# Patient Record
Sex: FEMALE | Race: WHITE | Employment: FULL TIME | URBAN - METROPOLITAN AREA
[De-identification: names, ages, dates, MRNs, and addresses within clinical notes are randomized per-mention and may not be internally consistent; named-entity substitution may affect disease eponyms.]

---

## 2023-05-02 ENCOUNTER — TELEPHONE (OUTPATIENT)
Dept: PRIMARY CARE | Facility: CLINIC | Age: 33
End: 2023-05-02

## 2023-05-02 DIAGNOSIS — I10 BENIGN ESSENTIAL HYPERTENSION: Primary | ICD-10-CM

## 2023-05-02 RX ORDER — SPIRONOLACTONE 25 MG/1
25 TABLET ORAL DAILY
Qty: 90 TABLET | Refills: 3 | Status: SHIPPED | OUTPATIENT
Start: 2023-05-02

## 2023-05-02 RX ORDER — CIPROFLOXACIN HYDROCHLORIDE 3 MG/ML
SOLUTION/ DROPS OPHTHALMIC
COMMUNITY
Start: 2022-12-31 | End: 2023-10-02 | Stop reason: ALTCHOICE

## 2023-05-02 RX ORDER — SPIRONOLACTONE 25 MG/1
25 TABLET ORAL DAILY
COMMUNITY
End: 2023-05-02 | Stop reason: SDUPTHER

## 2023-05-02 RX ORDER — ALPRAZOLAM 0.5 MG/1
1 TABLET ORAL 3 TIMES DAILY PRN
COMMUNITY
Start: 2022-08-12 | End: 2023-12-26 | Stop reason: SDUPTHER

## 2023-05-02 RX ORDER — BISOPROLOL FUMARATE 5 MG/1
TABLET, FILM COATED ORAL
Qty: 90 TABLET | Refills: 3 | Status: SHIPPED | OUTPATIENT
Start: 2023-05-02 | End: 2023-10-02 | Stop reason: ALTCHOICE

## 2023-05-02 RX ORDER — DULOXETIN HYDROCHLORIDE 30 MG/1
1 CAPSULE, DELAYED RELEASE ORAL DAILY
COMMUNITY
Start: 2022-08-12 | End: 2023-07-27

## 2023-05-02 RX ORDER — BISOPROLOL FUMARATE 5 MG/1
TABLET, FILM COATED ORAL
COMMUNITY
End: 2023-05-02 | Stop reason: SDUPTHER

## 2023-05-02 RX ORDER — ERGOCALCIFEROL 1.25 MG/1
1 CAPSULE ORAL WEEKLY
Qty: 4 CAPSULE | Refills: 16 | COMMUNITY
Start: 2022-03-01 | End: 2023-07-12

## 2023-05-02 RX ORDER — TOPIRAMATE 25 MG/1
1 TABLET ORAL 2 TIMES DAILY
COMMUNITY
Start: 2022-04-20 | End: 2023-12-26 | Stop reason: ALTCHOICE

## 2023-05-02 RX ORDER — SEMAGLUTIDE 0.5 MG/.5ML
0.5 INJECTION, SOLUTION SUBCUTANEOUS
COMMUNITY
Start: 2023-05-17 | End: 2023-06-16

## 2023-05-02 RX ORDER — ONDANSETRON 8 MG/1
TABLET, ORALLY DISINTEGRATING ORAL
COMMUNITY
Start: 2022-11-10 | End: 2023-10-02 | Stop reason: ALTCHOICE

## 2023-05-02 RX ORDER — NITROFURANTOIN 25; 75 MG/1; MG/1
100 CAPSULE ORAL 2 TIMES DAILY
COMMUNITY
Start: 2022-06-22 | End: 2023-10-02 | Stop reason: ALTCHOICE

## 2023-05-02 RX ORDER — CALCIUM CARBONATE 200(500)MG
1 TABLET,CHEWABLE ORAL AS NEEDED
COMMUNITY
Start: 2022-02-08

## 2023-05-02 RX ORDER — LANOLIN ALCOHOL/MO/W.PET/CERES
1000 CREAM (GRAM) TOPICAL
COMMUNITY
Start: 2023-04-19

## 2023-05-02 RX ORDER — SEMAGLUTIDE 0.25 MG/.5ML
0.25 INJECTION, SOLUTION SUBCUTANEOUS
COMMUNITY
End: 2023-10-02 | Stop reason: ALTCHOICE

## 2023-05-02 RX ORDER — HYDROXYZINE HYDROCHLORIDE 25 MG/1
TABLET, FILM COATED ORAL
COMMUNITY
Start: 2022-05-13 | End: 2023-10-02 | Stop reason: ALTCHOICE

## 2023-05-02 RX ORDER — VIT C/E/ZN/COPPR/LUTEIN/ZEAXAN 250MG-90MG
1000 CAPSULE ORAL
COMMUNITY
Start: 2023-04-19

## 2023-05-02 RX ORDER — DULOXETIN HYDROCHLORIDE 20 MG/1
20 CAPSULE, DELAYED RELEASE ORAL DAILY
COMMUNITY
Start: 2022-08-12 | End: 2023-10-02 | Stop reason: DRUGHIGH

## 2023-05-16 ENCOUNTER — APPOINTMENT (OUTPATIENT)
Dept: LAB | Facility: LAB | Age: 33
End: 2023-05-16

## 2023-05-16 LAB
ALANINE AMINOTRANSFERASE (SGPT) (U/L) IN SER/PLAS: 23 U/L (ref 7–45)
ALBUMIN (G/DL) IN SER/PLAS: 4.4 G/DL (ref 3.4–5)
ALKALINE PHOSPHATASE (U/L) IN SER/PLAS: 54 U/L (ref 33–110)
ASPARTATE AMINOTRANSFERASE (SGOT) (U/L) IN SER/PLAS: 20 U/L (ref 9–39)
BASOPHILS (10*3/UL) IN BLOOD BY AUTOMATED COUNT: 0.05 X10E9/L (ref 0–0.1)
BASOPHILS/100 LEUKOCYTES IN BLOOD BY AUTOMATED COUNT: 0.5 % (ref 0–2)
BILIRUBIN DIRECT (MG/DL) IN SER/PLAS: 0.1 MG/DL (ref 0–0.3)
BILIRUBIN TOTAL (MG/DL) IN SER/PLAS: 0.4 MG/DL (ref 0–1.2)
C REACTIVE PROTEIN (MG/L) IN SER/PLAS: 0.24 MG/DL
DEAMIDATED GLIADIN PEPTIDE IGA: 9 U/ML (ref 0–14)
DEAMIDATED GLIADIN PEPTIDE IGG: 90 U/ML (ref 0–14)
EOSINOPHILS (10*3/UL) IN BLOOD BY AUTOMATED COUNT: 0.11 X10E9/L (ref 0–0.7)
EOSINOPHILS/100 LEUKOCYTES IN BLOOD BY AUTOMATED COUNT: 1 % (ref 0–6)
ERYTHROCYTE DISTRIBUTION WIDTH (RATIO) BY AUTOMATED COUNT: 13.3 % (ref 11.5–14.5)
ERYTHROCYTE MEAN CORPUSCULAR HEMOGLOBIN CONCENTRATION (G/DL) BY AUTOMATED: 33 G/DL (ref 32–36)
ERYTHROCYTE MEAN CORPUSCULAR VOLUME (FL) BY AUTOMATED COUNT: 84 FL (ref 80–100)
ERYTHROCYTES (10*6/UL) IN BLOOD BY AUTOMATED COUNT: 5.5 X10E12/L (ref 4–5.2)
HEMATOCRIT (%) IN BLOOD BY AUTOMATED COUNT: 46 % (ref 36–46)
HEMOGLOBIN (G/DL) IN BLOOD: 15.2 G/DL (ref 12–16)
HEPATITIS A TOTAL AB INTERPRETATION: NONREACTIVE
HEPATITIS B VIRUS CORE AB (PRESENCE) IN SER/PLAS BY IMM: NONREACTIVE
HEPATITIS B VIRUS SURFACE AB (MIU/ML) IN SERUM: <3.1 MIU/ML
HEPATITIS B VIRUS SURFACE AG PRESENCE IN SERUM: NONREACTIVE
IMMATURE GRANULOCYTES/100 LEUKOCYTES IN BLOOD BY AUTOMATED COUNT: 0.3 % (ref 0–0.9)
LEUKOCYTES (10*3/UL) IN BLOOD BY AUTOMATED COUNT: 10.6 X10E9/L (ref 4.4–11.3)
LYMPHOCYTES (10*3/UL) IN BLOOD BY AUTOMATED COUNT: 2.63 X10E9/L (ref 1.2–4.8)
LYMPHOCYTES/100 LEUKOCYTES IN BLOOD BY AUTOMATED COUNT: 24.7 % (ref 13–44)
MONOCYTES (10*3/UL) IN BLOOD BY AUTOMATED COUNT: 0.83 X10E9/L (ref 0.1–1)
MONOCYTES/100 LEUKOCYTES IN BLOOD BY AUTOMATED COUNT: 7.8 % (ref 2–10)
NEUTROPHILS (10*3/UL) IN BLOOD BY AUTOMATED COUNT: 6.99 X10E9/L (ref 1.2–7.7)
NEUTROPHILS/100 LEUKOCYTES IN BLOOD BY AUTOMATED COUNT: 65.7 % (ref 40–80)
NRBC (PER 100 WBCS) BY AUTOMATED COUNT: 0 /100 WBC (ref 0–0)
PLATELETS (10*3/UL) IN BLOOD AUTOMATED COUNT: 337 X10E9/L (ref 150–450)
PROTEIN TOTAL: 6.8 G/DL (ref 6.4–8.2)
TISSUE TRANSGLUTAMINASE IGG: 21 U/ML (ref 0–14)
TISSUE TRANSGLUTAMINASE, IGA: 201 U/ML (ref 0–14)

## 2023-05-19 LAB
NIL(NEG) CONTROL SPOT COUNT: NORMAL
PANEL A SPOT COUNT: 0
PANEL B SPOT COUNT: 0
POS CONTROL SPOT COUNT: NORMAL
T-SPOT. TB INTERPRETATION: NEGATIVE

## 2023-07-27 DIAGNOSIS — F41.9 ANXIETY DISORDER, UNSPECIFIED: ICD-10-CM

## 2023-07-27 RX ORDER — CYCLOBENZAPRINE HCL 10 MG
1 TABLET ORAL 3 TIMES DAILY PRN
COMMUNITY
Start: 2022-03-12 | End: 2023-10-02 | Stop reason: ALTCHOICE

## 2023-07-27 RX ORDER — DULOXETIN HYDROCHLORIDE 30 MG/1
30 CAPSULE, DELAYED RELEASE ORAL DAILY
Qty: 90 CAPSULE | Refills: 3 | Status: SHIPPED | OUTPATIENT
Start: 2023-07-27 | End: 2023-12-26 | Stop reason: DRUGHIGH

## 2023-07-27 RX ORDER — FERROUS FUMARATE 324(106)MG
TABLET ORAL
COMMUNITY
Start: 2023-07-19

## 2023-07-27 RX ORDER — NORETHINDRONE 0.35 MG/1
1 TABLET ORAL DAILY
COMMUNITY
Start: 2018-05-02 | End: 2023-10-02 | Stop reason: ALTCHOICE

## 2023-07-27 RX ORDER — SERTRALINE HYDROCHLORIDE 50 MG/1
TABLET, FILM COATED ORAL
COMMUNITY
Start: 2018-05-02 | End: 2023-10-02 | Stop reason: ALTCHOICE

## 2023-07-27 RX ORDER — PEN NEEDLE, DIABETIC 31 GX5/16"
NEEDLE, DISPOSABLE MISCELLANEOUS
COMMUNITY
Start: 2023-06-28 | End: 2024-02-10 | Stop reason: ALTCHOICE

## 2023-07-27 RX ORDER — BISOPROLOL FUMARATE AND HYDROCHLOROTHIAZIDE 2.5; 6.25 MG/1; MG/1
TABLET ORAL
COMMUNITY
Start: 2018-04-10 | End: 2023-10-14 | Stop reason: ALTCHOICE

## 2023-08-29 PROBLEM — N94.6 DYSMENORRHEA: Status: ACTIVE | Noted: 2023-08-29

## 2023-08-29 PROBLEM — R53.83 FATIGUE: Status: ACTIVE | Noted: 2023-08-29

## 2023-08-29 PROBLEM — R87.610 ATYPICAL SQUAMOUS CELL OF UNDETERMINED SIGNIFICANCE OF CERVIX: Status: ACTIVE | Noted: 2023-08-29

## 2023-08-29 PROBLEM — Z79.899 HIGH RISK MEDICATION USE: Status: ACTIVE | Noted: 2023-08-29

## 2023-08-29 PROBLEM — R43.8 HYPOSMIA: Status: ACTIVE | Noted: 2023-08-29

## 2023-08-29 PROBLEM — E83.52 HYPERCALCEMIA: Status: ACTIVE | Noted: 2023-08-29

## 2023-08-29 PROBLEM — K50.90 CROHN'S DISEASE (MULTI): Status: ACTIVE | Noted: 2021-12-03

## 2023-08-29 PROBLEM — E55.9 VITAMIN D DEFICIENCY: Status: ACTIVE | Noted: 2023-08-29

## 2023-08-29 PROBLEM — R87.612 LGSIL ON PAP SMEAR OF CERVIX: Status: ACTIVE | Noted: 2023-08-29

## 2023-08-29 PROBLEM — Z78.9 USES CONTRACEPTION: Status: ACTIVE | Noted: 2023-08-29

## 2023-08-29 PROBLEM — M13.0 POLYARTHRITIS: Status: ACTIVE | Noted: 2023-08-29

## 2023-08-29 PROBLEM — R73.9 BORDERLINE HYPERGLYCEMIA: Status: ACTIVE | Noted: 2023-08-29

## 2023-08-29 PROBLEM — R63.5 ABNORMAL WEIGHT GAIN: Status: ACTIVE | Noted: 2023-08-29

## 2023-08-29 PROBLEM — R73.09 ELEVATED GLUCOSE: Status: ACTIVE | Noted: 2023-08-29

## 2023-08-29 PROBLEM — F41.9 ANXIETY: Status: ACTIVE | Noted: 2021-12-03

## 2023-08-29 PROBLEM — D75.1 POLYCYTHEMIA: Status: ACTIVE | Noted: 2023-08-29

## 2023-08-29 PROBLEM — M26.609 TMJ (TEMPOROMANDIBULAR JOINT SYNDROME): Status: ACTIVE | Noted: 2023-08-29

## 2023-08-29 PROBLEM — I10 PRIMARY HYPERTENSION: Status: ACTIVE | Noted: 2021-12-03

## 2023-08-31 PROBLEM — M54.9 BACK PAIN: Status: ACTIVE | Noted: 2023-08-31

## 2023-08-31 PROBLEM — B97.7 HPV (HUMAN PAPILLOMA VIRUS) INFECTION: Status: ACTIVE | Noted: 2023-08-31

## 2023-09-11 ENCOUNTER — HOSPITAL ENCOUNTER (OUTPATIENT)
Dept: DATA CONVERSION | Facility: HOSPITAL | Age: 33
End: 2023-09-11
Attending: INTERNAL MEDICINE | Admitting: INTERNAL MEDICINE

## 2023-09-11 DIAGNOSIS — K63.3 ULCER OF INTESTINE: ICD-10-CM

## 2023-09-11 DIAGNOSIS — K37 UNSPECIFIED APPENDICITIS: ICD-10-CM

## 2023-09-11 DIAGNOSIS — K29.70 GASTRITIS, UNSPECIFIED, WITHOUT BLEEDING: ICD-10-CM

## 2023-09-11 DIAGNOSIS — K50.90 CROHN'S DISEASE, UNSPECIFIED, WITHOUT COMPLICATIONS (MULTI): ICD-10-CM

## 2023-09-12 RX ORDER — SEMAGLUTIDE 1 MG/.5ML
INJECTION, SOLUTION SUBCUTANEOUS
COMMUNITY
Start: 2023-09-06 | End: 2023-12-26 | Stop reason: ALTCHOICE

## 2023-09-12 RX ORDER — SEMAGLUTIDE 1.7 MG/.75ML
INJECTION, SOLUTION SUBCUTANEOUS
COMMUNITY
Start: 2023-09-06

## 2023-09-15 LAB
COMPLETE PATHOLOGY REPORT: NORMAL
CONVERTED CLINICAL DIAGNOSIS-HISTORY: NORMAL
CONVERTED FINAL DIAGNOSIS: NORMAL
CONVERTED FINAL REPORT PDF LINK TO COPY AND PASTE: NORMAL
CONVERTED GROSS DESCRIPTION: NORMAL

## 2023-10-02 ENCOUNTER — TELEMEDICINE (OUTPATIENT)
Dept: PRIMARY CARE | Facility: CLINIC | Age: 33
End: 2023-10-02
Payer: COMMERCIAL

## 2023-10-02 DIAGNOSIS — U07.1 ACUTE COVID-19: Primary | ICD-10-CM

## 2023-10-02 PROBLEM — R43.8 HYPOSMIA: Status: RESOLVED | Noted: 2023-08-29 | Resolved: 2023-10-02

## 2023-10-02 PROCEDURE — 99214 OFFICE O/P EST MOD 30 MIN: CPT | Performed by: FAMILY MEDICINE

## 2023-10-02 RX ORDER — ONDANSETRON 4 MG/1
8 TABLET, FILM COATED ORAL EVERY 8 HOURS PRN
COMMUNITY
Start: 2023-09-13 | End: 2023-10-02 | Stop reason: ALTCHOICE

## 2023-10-02 ASSESSMENT — ENCOUNTER SYMPTOMS
HEADACHES: 0
SINUS COMPLAINT: 1
CHILLS: 0
COUGH: 1
SHORTNESS OF BREATH: 0
SORE THROAT: 0
HOARSE VOICE: 0
SWOLLEN GLANDS: 0
SINUS PRESSURE: 1

## 2023-10-02 ASSESSMENT — LIFESTYLE VARIABLES: HISTORY_OF_SMOKING: I HAVE NEVER SMOKED

## 2023-10-02 NOTE — LETTER
October 2, 2023     Patient: Clarice Handy   YOB: 1990   Date of Visit: 10/2/2023       To Whom It May Concern:    Clarice Handy was seen in my clinic on 10/2/2023 at 9:35 pm. Please excuse Clarice for her absence from work. She is unable to return to work on 10/8/2023 if repeat COVID test is negative and symptoms have improved.     If you have any questions or concerns, please don't hesitate to call.         Sincerely,         Anastasia Hernandez MD        CC: No Recipients

## 2023-10-03 NOTE — PATIENT INSTRUCTIONS
Paxlovid 3 tabs s 2 times a day for 5 days.     Symptom management. Flonase, antihistamine for drippy nose, Iburpofen or tylenol. Drink lot of fluid.     Check COVID test on Day 10. If symptoms better and test negative, can go back to work on 10/8. IF still positive at 10 days, contact primary.

## 2023-10-14 DIAGNOSIS — I10 BENIGN ESSENTIAL HYPERTENSION: Primary | ICD-10-CM

## 2023-10-14 RX ORDER — BISOPROLOL FUMARATE AND HYDROCHLOROTHIAZIDE 5; 6.25 MG/1; MG/1
1 TABLET ORAL DAILY
Qty: 90 TABLET | Refills: 1 | Status: SHIPPED | OUTPATIENT
Start: 2023-10-14 | End: 2024-03-02 | Stop reason: ALTCHOICE

## 2023-12-09 ENCOUNTER — TELEMEDICINE (OUTPATIENT)
Dept: PRIMARY CARE | Facility: CLINIC | Age: 33
End: 2023-12-09
Payer: COMMERCIAL

## 2023-12-09 DIAGNOSIS — H10.31 ACUTE BACTERIAL CONJUNCTIVITIS OF RIGHT EYE: Primary | ICD-10-CM

## 2023-12-09 PROCEDURE — 99213 OFFICE O/P EST LOW 20 MIN: CPT | Performed by: FAMILY MEDICINE

## 2023-12-09 RX ORDER — POLYMYXIN B SULFATE AND TRIMETHOPRIM 1; 10000 MG/ML; [USP'U]/ML
1 SOLUTION OPHTHALMIC EVERY 6 HOURS
Qty: 10 ML | Refills: 0 | Status: SHIPPED | OUTPATIENT
Start: 2023-12-09 | End: 2023-12-16

## 2023-12-09 NOTE — PATIENT INSTRUCTIONS
Please take your medications as prescribed  please call your pcp if your symptoms fail to improve or worsen

## 2023-12-09 NOTE — PROGRESS NOTES
Subjective   Patient ID: Clarice Handy is a 33 y.o. female who presents for right eye drainage    HPI  Virtual Visit    An interactive audio and video telecommunication system which permits real time communications between the patient (at the originating site) and provider (at the distant site) was utilized to provide this telehealth service.   Verbal consent was requested and obtained from Clarice Handy on this date, 12/09/23 for a telehealth visit.     Patient woke up with drainage and itching from her right eye this morning, has some crusting. No blurry vision or eye pain. Daughter with pink eye.   No cough, sob or wheezing, no fever    Review of Systems  General: no fever  Eyes: see HPI  ENT: no sore throat, no ear pain  Resp: no cough, sob or wheezing  Abd: no nausea/vomiting    Vitals:   due to telehealth visit, vitals not obtained, patient did not have them available at the time of the visit     Objective   Physical Exam  Physical exam done virtually through the computer screen     Gen: NAD  eyes: right eye mild crusting on eyelash  Nose: external nose normal   Resp: does not appear to be short of breath at present time, no audible wheezing    Assessment/Plan   Problem List Items Addressed This Visit    None  Visit Diagnoses       Acute bacterial conjunctivitis of right eye    -  Primary    Relevant Medications    polymyxin B sulf-trimethoprim (Polytrim) ophthalmic solution

## 2023-12-11 ENCOUNTER — APPOINTMENT (OUTPATIENT)
Dept: OBSTETRICS AND GYNECOLOGY | Facility: CLINIC | Age: 33
End: 2023-12-11

## 2023-12-26 ENCOUNTER — OFFICE VISIT (OUTPATIENT)
Dept: PRIMARY CARE | Facility: CLINIC | Age: 33
End: 2023-12-26
Payer: COMMERCIAL

## 2023-12-26 VITALS
RESPIRATION RATE: 17 BRPM | WEIGHT: 168.6 LBS | OXYGEN SATURATION: 98 % | DIASTOLIC BLOOD PRESSURE: 78 MMHG | BODY MASS INDEX: 28.09 KG/M2 | HEART RATE: 89 BPM | SYSTOLIC BLOOD PRESSURE: 126 MMHG | HEIGHT: 65 IN

## 2023-12-26 DIAGNOSIS — I10 BENIGN ESSENTIAL HYPERTENSION: ICD-10-CM

## 2023-12-26 DIAGNOSIS — F41.9 ANXIETY DISORDER, UNSPECIFIED: ICD-10-CM

## 2023-12-26 DIAGNOSIS — Z11.59 ENCOUNTER FOR HEPATITIS C SCREENING TEST FOR LOW RISK PATIENT: ICD-10-CM

## 2023-12-26 DIAGNOSIS — Z00.00 ANNUAL PHYSICAL EXAM: Primary | ICD-10-CM

## 2023-12-26 DIAGNOSIS — E78.5 HYPERLIPIDEMIA, UNSPECIFIED HYPERLIPIDEMIA TYPE: ICD-10-CM

## 2023-12-26 DIAGNOSIS — K50.90 CROHN'S DISEASE WITHOUT COMPLICATION, UNSPECIFIED GASTROINTESTINAL TRACT LOCATION (MULTI): ICD-10-CM

## 2023-12-26 DIAGNOSIS — Z11.4 ENCOUNTER FOR SCREENING FOR HIV: ICD-10-CM

## 2023-12-26 DIAGNOSIS — E55.9 VITAMIN D DEFICIENCY: ICD-10-CM

## 2023-12-26 DIAGNOSIS — D51.1 VIT B12 DEFIC ANEMIA D/T SLCTV VIT B12 MALABSORP W PROTEIN: ICD-10-CM

## 2023-12-26 PROCEDURE — 99214 OFFICE O/P EST MOD 30 MIN: CPT | Performed by: FAMILY MEDICINE

## 2023-12-26 PROCEDURE — 99395 PREV VISIT EST AGE 18-39: CPT | Performed by: FAMILY MEDICINE

## 2023-12-26 PROCEDURE — 3078F DIAST BP <80 MM HG: CPT | Performed by: FAMILY MEDICINE

## 2023-12-26 PROCEDURE — 3074F SYST BP LT 130 MM HG: CPT | Performed by: FAMILY MEDICINE

## 2023-12-26 PROCEDURE — 1036F TOBACCO NON-USER: CPT | Performed by: FAMILY MEDICINE

## 2023-12-26 RX ORDER — DULOXETIN HYDROCHLORIDE 30 MG/1
60 CAPSULE, DELAYED RELEASE ORAL DAILY
Qty: 90 CAPSULE | Refills: 3 | Status: SHIPPED | OUTPATIENT
Start: 2023-12-26 | End: 2024-04-16 | Stop reason: SDUPTHER

## 2023-12-26 RX ORDER — ALPRAZOLAM 0.5 MG/1
0.5 TABLET ORAL 3 TIMES DAILY PRN
Qty: 20 TABLET | Refills: 0 | Status: SHIPPED | OUTPATIENT
Start: 2023-12-26

## 2023-12-26 ASSESSMENT — PROMIS GLOBAL HEALTH SCALE
RATE_MENTAL_HEALTH: VERY GOOD
RATE_AVERAGE_PAIN: 2
EMOTIONAL_PROBLEMS: RARELY
RATE_SOCIAL_SATISFACTION: VERY GOOD
RATE_PHYSICAL_HEALTH: VERY GOOD
RATE_GENERAL_HEALTH: GOOD
CARRYOUT_SOCIAL_ACTIVITIES: VERY GOOD
CARRYOUT_PHYSICAL_ACTIVITIES: COMPLETELY
RATE_QUALITY_OF_LIFE: EXCELLENT

## 2023-12-26 ASSESSMENT — PAIN SCALES - GENERAL: PAINLEVEL: 0-NO PAIN

## 2023-12-26 ASSESSMENT — PATIENT HEALTH QUESTIONNAIRE - PHQ9
SUM OF ALL RESPONSES TO PHQ9 QUESTIONS 1 AND 2: 0
1. LITTLE INTEREST OR PLEASURE IN DOING THINGS: NOT AT ALL
2. FEELING DOWN, DEPRESSED OR HOPELESS: NOT AT ALL

## 2023-12-26 NOTE — PROGRESS NOTES
"Subjective   Patient ID: Clarice Handy is a 33 y.o. female who presents for Annual Exam (Would like to discuss stopping HTN meds. ).    HPI   Patient here for annual checkup.  She has a history of hypertension.  Works for [employer].  Recently took a job promotion which has increased her stress level.  Her anxiety has increased.  She is taking duloxetine and wondering if a larger dose would be helpful.    I have personally reviewed the [place]AR[place] report for patient. I have considered the risks of abuse, dependence, addiction and diversion.   Last URINE DRUG SCREEN DATE : N/A due to short-term prescription  Controlled Substance Agreement:   I have printed this form and reviewed each line item with the patient and the patient has verbalized understanding.   Date of the last CONTROLLED SUBSTANCE AGREEMENT: N/A due to short-term prescription  Is relief being achieved with current treatment? yes.  Activities of Daily Living:   Yes, it is my opinion that this patient is benefitting from therapy .   Tolerating medication.  Using the medication has helped diminish previous symptoms.      Review of Systems    Objective   /78 (BP Location: Left arm, Patient Position: Sitting, BP Cuff Size: Adult)   Pulse 89   Resp 17   Ht 1.645 m (5' 4.76\")   Wt 76.5 kg (168 lb 9.6 oz)   LMP 12/24/2023 (Exact Date)   SpO2 98%   BMI 28.26 kg/m²     Physical Exam  Alert, pleasant and in no acute distress.  Heart: Regular rate and rhythm without murmur  Lungs: Clear to auscultation  Lower extremities: No edema  Abdomen: Soft, nontender without palpable mass    Assessment/Plan   Problem List Items Addressed This Visit             ICD-10-CM       Endocrine/Metabolic    Vitamin D deficiency E55.9    Relevant Orders    Vitamin D 25-Hydroxy,Total (for eval of Vitamin D levels)       Gastrointestinal and Abdominal    Crohn's disease (CMS/HCC) K50.90    Relevant Orders    Comprehensive Metabolic Panel    CBC     Other Visit Diagnoses         Codes "    Annual physical exam    -  Primary Z00.00    Relevant Orders    Comprehensive Metabolic Panel    CBC    Lipid Panel    Vitamin D 25-Hydroxy,Total (for eval of Vitamin D levels)    Vitamin B12    HIV 1/2 Antigen/Antibody Screen with Reflex to Confirmation    Hepatitis C antibody    Anxiety disorder, unspecified     F41.9    Relevant Medications    ALPRAZolam (Xanax) 0.5 mg tablet    DULoxetine (Cymbalta) 30 mg DR capsule    Benign essential hypertension     I10    Relevant Orders    Comprehensive Metabolic Panel    CBC    Lipid Panel    Hyperlipidemia, unspecified hyperlipidemia type     E78.5    Relevant Orders    Lipid Panel    Vit B12 defic anemia d/t slctv vit B12 malabsorp w protein     D51.1    Relevant Orders    Vitamin B12    Encounter for screening for HIV     Z11.4    Relevant Orders    HIV 1/2 Antigen/Antibody Screen with Reflex to Confirmation    Encounter for hepatitis C screening test for low risk patient     Z11.59    Relevant Orders    Hepatitis C antibody        1.  Annual checkup: Care gaps addressed.  Routine labs ordered.  2.  Anxiety: Patient has had an increased anxiety with her increased job stress.  We will bump up the duloxetine from the very low-dose of 30 mg to 60 mg daily.  She is to give me an update on how she is doing in 1 month.  Short-term prescription of Xanax provided for flight anxiety.  Patient plans to be taking a few trips this upcoming year and utilizes the Xanax when flying.  She has safely used this in the past and has a good understanding of its risks and benefits.  3.  Hypertension: Patient has worked hard on weight loss.  She has been on Wegovy with good success.  Her blood pressures have been stable in office and at home.  We are going to have her cut her bisoprolol in half.  If her pressures remain good after 2 months she can discontinue and continue to monitor her blood pressures closely.  4.  Vitamin D and B12 deficiency: We will recheck levels  5.  Hyperlipidemia:  Lipid panel ordered.  6.  Crohn's: Stable    Will contact patient when test results available.  Will plan follow-up in 3 months

## 2023-12-28 ENCOUNTER — APPOINTMENT (OUTPATIENT)
Dept: PRIMARY CARE | Facility: CLINIC | Age: 33
End: 2023-12-28

## 2024-02-10 ENCOUNTER — TELEMEDICINE (OUTPATIENT)
Dept: PRIMARY CARE | Facility: CLINIC | Age: 34
End: 2024-02-10
Payer: COMMERCIAL

## 2024-02-10 DIAGNOSIS — B96.89 ACUTE BACTERIAL SINUSITIS: Primary | ICD-10-CM

## 2024-02-10 DIAGNOSIS — J01.90 ACUTE BACTERIAL SINUSITIS: Primary | ICD-10-CM

## 2024-02-10 PROCEDURE — 1036F TOBACCO NON-USER: CPT | Performed by: STUDENT IN AN ORGANIZED HEALTH CARE EDUCATION/TRAINING PROGRAM

## 2024-02-10 PROCEDURE — 99213 OFFICE O/P EST LOW 20 MIN: CPT | Performed by: STUDENT IN AN ORGANIZED HEALTH CARE EDUCATION/TRAINING PROGRAM

## 2024-02-10 RX ORDER — AMOXICILLIN AND CLAVULANATE POTASSIUM 875; 125 MG/1; MG/1
1 TABLET, FILM COATED ORAL 2 TIMES DAILY
Qty: 20 TABLET | Refills: 0 | Status: SHIPPED | OUTPATIENT
Start: 2024-02-10 | End: 2024-02-20

## 2024-02-10 ASSESSMENT — LIFESTYLE VARIABLES: HISTORY_OF_SMOKING: I HAVE NEVER SMOKED

## 2024-02-10 NOTE — PROGRESS NOTES
Subjective   Patient ID: Clarice Handy is a 33 y.o. female who presents for Sinus Problem.    HPI    Here for sinus infection concerns.   Sinus pressure in maxillary sinuses since a week and half.    Has tried OTC without relief.   Celiac diagnosed this year, since diagnosis feels like needs more support with treatment of infections. Mild headache.   No fever, or difficulty in breathing. Occasional cough- morning.     Review of Systems  ROS negative except discussed above in HPI.    There were no vitals filed for this visit.  Objective   Physical Exam  Not in acute distress. Limited exam due to nature of the visit     Assessment/Plan   Clarice was seen today for sinus problem.  Diagnoses and all orders for this visit:  Acute bacterial sinusitis (Primary)  -     amoxicillin-pot clavulanate (Augmentin) 875-125 mg tablet; Take 1 tablet by mouth 2 times a day for 10 days.      Follow up as needed.      Mendez Thao MD MPH

## 2024-02-22 ENCOUNTER — TELEMEDICINE (OUTPATIENT)
Dept: PRIMARY CARE | Facility: CLINIC | Age: 34
End: 2024-02-22
Payer: COMMERCIAL

## 2024-02-22 DIAGNOSIS — J01.90 ACUTE BACTERIAL SINUSITIS: Primary | ICD-10-CM

## 2024-02-22 DIAGNOSIS — B96.89 ACUTE BACTERIAL SINUSITIS: Primary | ICD-10-CM

## 2024-02-22 PROCEDURE — 1036F TOBACCO NON-USER: CPT | Performed by: NURSE PRACTITIONER

## 2024-02-22 PROCEDURE — 99213 OFFICE O/P EST LOW 20 MIN: CPT | Performed by: NURSE PRACTITIONER

## 2024-02-22 RX ORDER — AMOXICILLIN AND CLAVULANATE POTASSIUM 875; 125 MG/1; MG/1
875 TABLET, FILM COATED ORAL 2 TIMES DAILY
Qty: 8 TABLET | Refills: 0 | Status: SHIPPED | OUTPATIENT
Start: 2024-02-22 | End: 2024-03-02 | Stop reason: WASHOUT

## 2024-02-22 RX ORDER — METHYLPREDNISOLONE 4 MG/1
TABLET ORAL
Qty: 21 TABLET | Refills: 0 | Status: SHIPPED | OUTPATIENT
Start: 2024-02-22 | End: 2024-03-02 | Stop reason: WASHOUT

## 2024-02-22 ASSESSMENT — ENCOUNTER SYMPTOMS
NAUSEA: 0
MYALGIAS: 0
SINUS PAIN: 1
FEVER: 0
WHEEZING: 0
FATIGUE: 0
CHILLS: 0
HEADACHES: 1
DIARRHEA: 0
RHINORRHEA: 1
VOMITING: 0
SORE THROAT: 0
SHORTNESS OF BREATH: 0
SINUS PRESSURE: 1
COUGH: 0
APPETITE CHANGE: 0

## 2024-02-22 ASSESSMENT — LIFESTYLE VARIABLES: HISTORY_OF_SMOKING: I HAVE NEVER SMOKED

## 2024-02-22 NOTE — PROGRESS NOTES
Patient had a sinus infection and was treated with augmentin for ten days. She finished the antibiotic two days ago. She has been doing the flonase. She says that she was feeling well on the antibiotic and last night, she had sinus pressure and her teeth hurt. No fevers. Patient just had COVID in December 2023 and is vaccinated against COVID. No fevers.     Review of Systems   Constitutional:  Negative for appetite change, chills, fatigue and fever.   HENT:  Positive for congestion, postnasal drip, rhinorrhea, sinus pressure and sinus pain. Negative for sore throat.    Respiratory:  Negative for cough, shortness of breath and wheezing.    Cardiovascular:  Negative for chest pain.   Gastrointestinal:  Negative for diarrhea, nausea and vomiting.   Musculoskeletal:  Negative for myalgias.   Neurological:  Positive for headaches.     Objective   There were no vitals taken for this visit.    Physical Exam  Constitutional:       General: She is not in acute distress.     Appearance: Normal appearance. She is not ill-appearing or toxic-appearing.   HENT:      Head: Atraumatic.   Pulmonary:      Effort: Pulmonary effort is normal.   Neurological:      Mental Status: She is alert.     Assessment/Plan   Problem List Items Addressed This Visit    None  Visit Diagnoses         Codes    Acute bacterial sinusitis    -  Primary J01.90, B96.89    Relevant Medications    amoxicillin-pot clavulanate (Augmentin) 875-125 mg tablet    methylPREDNISolone (Medrol Dospak) 4 mg tablets        This visit was completed via Epic. All issues as below were discussed and addressed but no physical exam was performed. If it was felt that the patient should be evaluated in the clinic then they were directed there. The patient verbally consented to the visit.     Approximately 15 minutes spent performing virtual video visit.     Patient just finished augmentin course and symptoms have recurred. Will extend treatment with augmentin for four more days.  Patient to also start on medrol lance. Pt reminded to avoid other anti-inflammatories while on the steroids; she agreed. Advised patient on use of humidifier and hot steam treatments. Discussed that patient is to drink plenty of fluids and stay well hydrated.Discussed that patient is to go to the ER for any chest pain, difficulty breathing, shortness of breath or new/concerning symptoms; she agreed. Pt to follow up in 2-3 days in person if no better despite the use of the medications.

## 2024-03-02 ENCOUNTER — E-VISIT (OUTPATIENT)
Dept: PRIMARY CARE | Facility: CLINIC | Age: 34
End: 2024-03-02

## 2024-03-02 DIAGNOSIS — B37.31 YEAST VAGINITIS: Primary | ICD-10-CM

## 2024-03-02 RX ORDER — FLUCONAZOLE 150 MG/1
150 TABLET ORAL SEE ADMIN INSTRUCTIONS
Qty: 2 TABLET | Refills: 0 | Status: SHIPPED | OUTPATIENT
Start: 2024-03-02 | End: 2024-03-03

## 2024-04-15 ENCOUNTER — TELEPHONE (OUTPATIENT)
Dept: GASTROENTEROLOGY | Facility: HOSPITAL | Age: 34
End: 2024-04-15

## 2024-04-15 NOTE — TELEPHONE ENCOUNTER
Patient has an upcoming appointment with Dr. Dimas  Should she have bloodwork done prior to her appointment?  [phone]

## 2024-04-16 ENCOUNTER — TELEPHONE (OUTPATIENT)
Dept: PRIMARY CARE | Facility: CLINIC | Age: 34
End: 2024-04-16

## 2024-04-16 DIAGNOSIS — F41.9 ANXIETY DISORDER, UNSPECIFIED: ICD-10-CM

## 2024-04-16 RX ORDER — DULOXETINE 40 MG/1
40 CAPSULE, DELAYED RELEASE ORAL DAILY
Qty: 30 CAPSULE | Refills: 11 | Status: SHIPPED | OUTPATIENT
Start: 2024-04-16 | End: 2024-04-23 | Stop reason: ALTCHOICE

## 2024-04-16 NOTE — TELEPHONE ENCOUNTER
Called pt and left vm regarding rx. Also, for pt to call back to schedule a 1 month follow up to review meds.

## 2024-04-16 NOTE — TELEPHONE ENCOUNTER
Pt left vm stating that she started taking Cymbalta and is making her really sick. Pt is wondering if medication can be changed to a different type. Pt did increase the dosage last time. Pt would like to know what to do?

## 2024-04-22 NOTE — PROGRESS NOTES
REASON FOR VISIT:  Crohn's disease    HPI:  Clarice Handy is a 34 y.o. female who presents for follow-up of Crohn's disease.  Crohn's gastritis and ileocolitis diagnosed at age 15 in 2005, when she presented with diarrhea, abdominal pain, and dehydration. Upper endoscopy and colonoscopy showed a granulomatous gastritis along with duodenitis and active ileocolonic disease to the mid-transverse colon.      Initially with prednisone and azathioprine. Over approximately 4 months prednisone was weaned and she was maintained on azathioprine for approximately 1-1/2 years.      (+) Vitamin D Deficiency     Stopped all meds in 2007 and remained well. She had a followup upper endoscopy and colonoscopy in October 2010, which showed only mild architectural abnormalities in the right colon, mild active chronic ileitis, and mild gastritis.     8/2015 c/o diffuse joint discomforts. Her bowel symptoms were minor. She had one formed BM daily without any blood or mucus. Budesonide and Pentasa started.      2/2016 and reported improvement with meds and some recurrence of joint pain after tapered off.      2/2022 in clinical remission off medications until Covid infection the prior month with some symptom flare; improved on her own. Fecal Calprotectin 44 ug/g. Surveillance EGD/Colonoscopy ordered w/anesthesia, but never completed.     5/2023 BM daily; pudding consistency, no blood. C/O joint pain elbows, knees, hands, wrists - notices better if avoids gluten. On Wegovy weight dropped 16 lbs. Iron pills causes nausea. Has had lower back that she sometimes gets cortisone shots for.     9/2023 EGD/colonoscopy: gastritis; normal ileum and patch of colitis in cecum and some small pseudopolyps in descending colon.  Path showed duodenum with increased intraepithelial lymphocytes.    In follow-up today, patient is completely gluten-free for the last year for concern for Celiac disease based on serologies 5/16/23 and findings on EGD 9/2023.  Notes improvement in joint pains, skin findings, abdominal bloating, and overall inflammatory symptoms. Notes 1 semi-formed BM/day without bleeding or abdominal pain. No significant issues with Crohn's disease other than issues during her menstruation with noted lower abdominal pain but without changes in her Bms. Patient is pending further evaluation with Gynecology regarding management of her menstruation. Patient additionally notes exacerbation of her Crohn's disease with cruciferous vegetables. Patient with significant weight loss on semaglutide with 55 lbs with total 70 lbs. Concerned with hair loss with significant weight loss. Notes some difficulties with tolerating her PO vitamins due to vomiting. Additionally, noting difficulty with tolerating her current anxiolytic pending PCP follow up. Patient with noted pregnancy in 11/2023 that was terminated due to concern for medical concerns.      REVIEW OF SYSTEMS    Allergies   Allergen Reactions    Sumatriptan Anaphylaxis    Latex Rash       Past Medical History:   Diagnosis Date    Allergic 2000    Anxiety 2007    Encounter for gynecological examination (general) (routine) with abnormal findings 10/12/2021    Encounter for gynecological examination with abnormal finding    Encounter for gynecological examination (general) (routine) without abnormal findings     Pap smear, as part of routine gynecological examination    Hypertension     Other specified postprocedural states 10/12/2021    History of Papanicolaou smear    Personal history of other diseases of the digestive system 12/31/2013    History of Crohn's disease    Personal history of other diseases of the female genital tract     H/O abnormal Pap smear    Personal history of other infectious and parasitic diseases     History of varicella    Pregnant state, incidental ([facility])     IUP (intrauterine pregnancy), incidental       Past Surgical History:   Procedure Laterality Date    COLPOSCOPY  10/01/2014     Colposcopy    MOUTH SURGERY  10/18/2016    Oral Surgery Tooth Extraction    WISDOM TOOTH EXTRACTION         Current Outpatient Medications   Medication Sig Dispense Refill    ALPRAZolam (Xanax) 0.5 mg tablet Take 1 tablet (0.5 mg) by mouth 3 times a day as needed for anxiety. 20 tablet 0    ascorbic acid (VITAMIN C ORAL) Take 1 tablet by mouth once daily.      calcium carbonate (Tums) 200 mg calcium chewable tablet Chew 1 tablet (500 mg) if needed for heartburn.      cholecalciferol (Vitamin D-3) 25 MCG (1000 UT) capsule Take 1 capsule (25 mcg) by mouth once daily.      cyanocobalamin (Vitamin B-12) 1,000 mcg tablet Take 1 tablet (1,000 mcg) by mouth once daily.      DULoxetine 40 mg DR capsule Take 1 capsule (40 mg) by mouth once daily. 30 capsule 11    Ferretts 325 mg (106 mg iron) tablet       spironolactone (Aldactone) 25 mg tablet Take 1 tablet (25 mg) by mouth once daily. 90 tablet 3    Wegovy 1.7 mg/0.75 mL pen injector        No current facility-administered medications for this visit.       PHYSICAL EXAM:  There were no vitals taken for this visit.   Cardiac: RRR, no murmurs  Pulmonary: clear to auscultation, no wheezing  Abdominal: normal abdominal examination, soft, non-tender, normal bowel sounds    Lab Results   Component Value Date    WBC 10.6 05/16/2023    HGB 15.2 05/16/2023    HCT 46.0 05/16/2023    MCV 84 05/16/2023     05/16/2023     Lab Results   Component Value Date    ALT 23 05/16/2023    AST 20 05/16/2023    ALKPHOS 54 05/16/2023    BILITOT 0.4 05/16/2023     ASSESSMENT  #Crohn's disease with gastritis/ileocolitis: Patient is not on medical therapy with reassuring inflammatory markers and endoscopic evaluation 9/2023. She notes intermittent abdominal pain with menstruation with 1 semi-formed BM/day. Denies joint or skin findings currently - previous noted joint pains and facial purplish rash improved with gluten free diet.    #Celiac disease: Concern based on serologies, duodenal biopsy  results, and family history of Celiac disease with confounding Crohn's disease in UGI tract. Patient has been gluten free for the past year with significant improvement in joints, skin, and abdominal bloating. No repeat serologic testing since initial diagnosis.  Discussed with patient that it is unusual to have both Crohn's disease and celiac disease, though certainly possible.  In addition to serologic testing, we will do celiac genetics to see if she is DQ 2/DQ 8 positive.  If she is not, then she does not have celiac disease and likely has elevated serologies based on her Crohn's disease.    PLAN  - check iron studies, vitamin B12 in setting of poor tolerance of PO vitamins, follow up pending PCP labs  - check repeat celiac serologies, HLA-DQ2/DQ8  - continue current gluten free diet  - follow up in GI clinic in 1 year

## 2024-04-23 ENCOUNTER — OFFICE VISIT (OUTPATIENT)
Dept: GASTROENTEROLOGY | Facility: HOSPITAL | Age: 34
End: 2024-04-23
Payer: COMMERCIAL

## 2024-04-23 VITALS
TEMPERATURE: 96.7 F | BODY MASS INDEX: 28.53 KG/M2 | SYSTOLIC BLOOD PRESSURE: 122 MMHG | WEIGHT: 161 LBS | HEIGHT: 63 IN | DIASTOLIC BLOOD PRESSURE: 85 MMHG | HEART RATE: 51 BPM

## 2024-04-23 DIAGNOSIS — K90.0 CELIAC DISEASE (HHS-HCC): Primary | ICD-10-CM

## 2024-04-23 DIAGNOSIS — K50.018 CROHN'S DISEASE OF SMALL INTESTINE WITH OTHER COMPLICATION (MULTI): ICD-10-CM

## 2024-04-23 PROCEDURE — 3079F DIAST BP 80-89 MM HG: CPT | Performed by: INTERNAL MEDICINE

## 2024-04-23 PROCEDURE — 3074F SYST BP LT 130 MM HG: CPT | Performed by: INTERNAL MEDICINE

## 2024-04-23 PROCEDURE — 1036F TOBACCO NON-USER: CPT | Performed by: INTERNAL MEDICINE

## 2024-04-23 PROCEDURE — 99214 OFFICE O/P EST MOD 30 MIN: CPT | Performed by: INTERNAL MEDICINE

## 2024-04-23 NOTE — PATIENT INSTRUCTIONS
Thank you for coming in for follow-up evaluation.  I am glad that,, overall, you are feeling improved on a gluten-free diet.  As we discussed today, we will check some lab test to make sure that you are intestinal inflammation is improving on the gluten-free diet and that celiac serologies are coming down.  As we discussed today:    1) check labs; we will let you know these results once available  2) continue gluten-free diet  3) for now, you can hold iron supplementation and vitamin B12 supplementation  4) as long as symptoms are stable and you are feeling well, follow-up in the office in 1 year  5) follow-up sooner with any gastrointestinal concerns

## 2024-04-26 ENCOUNTER — LAB (OUTPATIENT)
Dept: LAB | Facility: LAB | Age: 34
End: 2024-04-26
Payer: COMMERCIAL

## 2024-04-26 DIAGNOSIS — D51.1 VIT B12 DEFIC ANEMIA D/T SLCTV VIT B12 MALABSORP W PROTEIN: ICD-10-CM

## 2024-04-26 DIAGNOSIS — K90.0 CELIAC DISEASE (HHS-HCC): ICD-10-CM

## 2024-04-26 DIAGNOSIS — E78.5 HYPERLIPIDEMIA, UNSPECIFIED HYPERLIPIDEMIA TYPE: ICD-10-CM

## 2024-04-26 DIAGNOSIS — Z11.4 ENCOUNTER FOR SCREENING FOR HIV: ICD-10-CM

## 2024-04-26 DIAGNOSIS — K50.90 CROHN'S DISEASE WITHOUT COMPLICATION, UNSPECIFIED GASTROINTESTINAL TRACT LOCATION (MULTI): ICD-10-CM

## 2024-04-26 DIAGNOSIS — E55.9 VITAMIN D DEFICIENCY: ICD-10-CM

## 2024-04-26 DIAGNOSIS — Z00.00 ANNUAL PHYSICAL EXAM: ICD-10-CM

## 2024-04-26 DIAGNOSIS — I10 BENIGN ESSENTIAL HYPERTENSION: ICD-10-CM

## 2024-04-26 DIAGNOSIS — Z11.59 ENCOUNTER FOR HEPATITIS C SCREENING TEST FOR LOW RISK PATIENT: ICD-10-CM

## 2024-04-26 LAB
25(OH)D3 SERPL-MCNC: 29 NG/ML (ref 30–100)
ALBUMIN SERPL BCP-MCNC: 4.1 G/DL (ref 3.4–5)
ALP SERPL-CCNC: 42 U/L (ref 33–110)
ALT SERPL W P-5'-P-CCNC: 16 U/L (ref 7–45)
ANION GAP SERPL CALC-SCNC: 11 MMOL/L (ref 10–20)
AST SERPL W P-5'-P-CCNC: 16 U/L (ref 9–39)
BILIRUB SERPL-MCNC: 0.6 MG/DL (ref 0–1.2)
BUN SERPL-MCNC: 11 MG/DL (ref 6–23)
CALCIUM SERPL-MCNC: 9.1 MG/DL (ref 8.6–10.6)
CHLORIDE SERPL-SCNC: 104 MMOL/L (ref 98–107)
CHOLEST SERPL-MCNC: 107 MG/DL (ref 0–199)
CHOLESTEROL/HDL RATIO: 2.6
CO2 SERPL-SCNC: 27 MMOL/L (ref 21–32)
CREAT SERPL-MCNC: 0.89 MG/DL (ref 0.5–1.05)
EGFRCR SERPLBLD CKD-EPI 2021: 87 ML/MIN/1.73M*2
ERYTHROCYTE [DISTWIDTH] IN BLOOD BY AUTOMATED COUNT: 13 % (ref 11.5–14.5)
FERRITIN SERPL-MCNC: 28 NG/ML (ref 8–150)
GLIADIN PEPTIDE IGA SER IA-ACNC: 4 U/ML
GLUCOSE SERPL-MCNC: 76 MG/DL (ref 74–99)
HCT VFR BLD AUTO: 49.8 % (ref 36–46)
HCV AB SER QL: NONREACTIVE
HDLC SERPL-MCNC: 41.2 MG/DL
HGB BLD-MCNC: 15.4 G/DL (ref 12–16)
HIV 1+2 AB+HIV1 P24 AG SERPL QL IA: NONREACTIVE
IRON SATN MFR SERPL: 22 % (ref 25–45)
IRON SERPL-MCNC: 81 UG/DL (ref 35–150)
LDLC SERPL CALC-MCNC: 52 MG/DL
MCH RBC QN AUTO: 27 PG (ref 26–34)
MCHC RBC AUTO-ENTMCNC: 30.9 G/DL (ref 32–36)
MCV RBC AUTO: 87 FL (ref 80–100)
NON HDL CHOLESTEROL: 66 MG/DL (ref 0–149)
NRBC BLD-RTO: 0 /100 WBCS (ref 0–0)
PLATELET # BLD AUTO: 284 X10*3/UL (ref 150–450)
POTASSIUM SERPL-SCNC: 4.4 MMOL/L (ref 3.5–5.3)
PROT SERPL-MCNC: 6.6 G/DL (ref 6.4–8.2)
RBC # BLD AUTO: 5.71 X10*6/UL (ref 4–5.2)
SODIUM SERPL-SCNC: 138 MMOL/L (ref 136–145)
TIBC SERPL-MCNC: 364 UG/DL (ref 240–445)
TRIGL SERPL-MCNC: 68 MG/DL (ref 0–149)
TTG IGA SER IA-ACNC: 16.1 U/ML
UIBC SERPL-MCNC: 283 UG/DL (ref 110–370)
VIT B12 SERPL-MCNC: 333 PG/ML (ref 211–911)
VLDL: 14 MG/DL (ref 0–40)
WBC # BLD AUTO: 8.7 X10*3/UL (ref 4.4–11.3)

## 2024-04-26 PROCEDURE — 82728 ASSAY OF FERRITIN: CPT

## 2024-04-26 PROCEDURE — 83550 IRON BINDING TEST: CPT

## 2024-04-26 PROCEDURE — 83516 IMMUNOASSAY NONANTIBODY: CPT

## 2024-04-26 PROCEDURE — 82306 VITAMIN D 25 HYDROXY: CPT

## 2024-04-26 PROCEDURE — 80053 COMPREHEN METABOLIC PANEL: CPT

## 2024-04-26 PROCEDURE — 85027 COMPLETE CBC AUTOMATED: CPT

## 2024-04-26 PROCEDURE — 82607 VITAMIN B-12: CPT

## 2024-04-26 PROCEDURE — 36415 COLL VENOUS BLD VENIPUNCTURE: CPT

## 2024-04-26 PROCEDURE — 80061 LIPID PANEL: CPT

## 2024-04-26 PROCEDURE — 81383 HLA II TYPING 1 ALLELE HR: CPT

## 2024-04-26 PROCEDURE — 86803 HEPATITIS C AB TEST: CPT

## 2024-04-26 PROCEDURE — 87389 HIV-1 AG W/HIV-1&-2 AB AG IA: CPT

## 2024-04-26 PROCEDURE — 83540 ASSAY OF IRON: CPT

## 2024-04-27 LAB
GLIADIN PEPTIDE IGG SER IA-ACNC: 5.21 FLU (ref 0–4.99)
TTG IGG SER IA-ACNC: <0.82 FLU (ref 0–4.99)

## 2024-05-03 LAB
DQA1*05: POSITIVE
DQB1*02:01: POSITIVE
DQB1*02:02: NEGATIVE
DQB1*03:02: NEGATIVE
HLA RESULTS: NORMAL

## 2024-06-16 DIAGNOSIS — I10 BENIGN ESSENTIAL HYPERTENSION: ICD-10-CM

## 2024-06-17 RX ORDER — SPIRONOLACTONE 25 MG/1
25 TABLET ORAL DAILY
Qty: 90 TABLET | Refills: 2 | Status: SHIPPED | OUTPATIENT
Start: 2024-06-17

## 2024-06-27 ENCOUNTER — APPOINTMENT (OUTPATIENT)
Dept: PRIMARY CARE | Facility: CLINIC | Age: 34
End: 2024-06-27
Payer: COMMERCIAL

## 2024-06-27 VITALS
SYSTOLIC BLOOD PRESSURE: 114 MMHG | DIASTOLIC BLOOD PRESSURE: 72 MMHG | WEIGHT: 154.4 LBS | OXYGEN SATURATION: 97 % | BODY MASS INDEX: 27.35 KG/M2 | HEART RATE: 74 BPM

## 2024-06-27 DIAGNOSIS — I10 PRIMARY HYPERTENSION: ICD-10-CM

## 2024-06-27 DIAGNOSIS — F41.9 ANXIETY DISORDER, UNSPECIFIED TYPE: Primary | ICD-10-CM

## 2024-06-27 PROCEDURE — 3078F DIAST BP <80 MM HG: CPT | Performed by: FAMILY MEDICINE

## 2024-06-27 PROCEDURE — 1036F TOBACCO NON-USER: CPT | Performed by: FAMILY MEDICINE

## 2024-06-27 PROCEDURE — 3074F SYST BP LT 130 MM HG: CPT | Performed by: FAMILY MEDICINE

## 2024-06-27 PROCEDURE — 99214 OFFICE O/P EST MOD 30 MIN: CPT | Performed by: FAMILY MEDICINE

## 2024-06-27 RX ORDER — ESCITALOPRAM OXALATE 5 MG/1
5 TABLET ORAL DAILY
Qty: 30 TABLET | Refills: 6 | Status: SHIPPED | OUTPATIENT
Start: 2024-06-27 | End: 2025-01-23

## 2024-06-27 RX ORDER — SEMAGLUTIDE 2.4 MG/.75ML
INJECTION, SOLUTION SUBCUTANEOUS
COMMUNITY
Start: 2024-06-20

## 2024-06-27 ASSESSMENT — PAIN SCALES - GENERAL: PAINLEVEL: 0-NO PAIN

## 2024-06-27 ASSESSMENT — ENCOUNTER SYMPTOMS: DEPRESSION: 0

## 2024-06-27 NOTE — PROGRESS NOTES
Subjective   Patient ID: Clarice Handy is a 34 y.o. female who presents for Follow-up (Pt is here to review medications. ).    HPI   Patient for follow-up.  She is generally doing well.  She has worked hard on getting her weight down.  She continues on Wegovy.  Anxiety has been a problem and she is not interested in any treatment.  Duloxetine was upsetting her stomach.    Review of Systems    Objective   /72 (BP Location: Left arm, Patient Position: Sitting, BP Cuff Size: Adult)   Pulse 74   Wt 70 kg (154 lb 6.4 oz)   LMP 06/23/2024   SpO2 97%   BMI 27.35 kg/m²     Physical Exam   alert, pleasant and in no acute distress.  Heart: Regular rate and rhythm without murmur  Lungs: Clear to auscultation  Lower extremities: No edema is tremendously she can do a  Assessment/Plan   Problem List Items Addressed This Visit             ICD-10-CM    Primary hypertension I10     Other Visit Diagnoses         Codes    Anxiety disorder, unspecified type    -  Primary F41.9    Relevant Medications    escitalopram (Lexapro) 5 mg tablet         anxiety: Will start escitalopram therapy.  Reviewed potential side effects.  If not improving in 4 to 6 weeks, patient can call for an increase in dose.  Will see her back in 2 to 3 months.  Hypertension: Under good control with spironolactone.       
Yes

## 2024-07-01 DIAGNOSIS — K50.018 CROHN'S DISEASE OF SMALL INTESTINE WITH OTHER COMPLICATION (MULTI): ICD-10-CM

## 2024-07-01 DIAGNOSIS — K90.0 CELIAC DISEASE (HHS-HCC): Primary | ICD-10-CM

## 2024-07-01 DIAGNOSIS — R11.0 NAUSEA: ICD-10-CM

## 2024-07-01 RX ORDER — ONDANSETRON 4 MG/1
4 TABLET, FILM COATED ORAL EVERY 8 HOURS PRN
Qty: 20 TABLET | Refills: 0 | Status: SHIPPED | OUTPATIENT
Start: 2024-07-01 | End: 2024-07-11

## 2024-09-26 ENCOUNTER — APPOINTMENT (OUTPATIENT)
Dept: PRIMARY CARE | Facility: CLINIC | Age: 34
End: 2024-09-26
Payer: COMMERCIAL

## 2024-10-30 ENCOUNTER — OFFICE VISIT (OUTPATIENT)
Dept: URGENT CARE | Age: 34
End: 2024-10-30
Payer: COMMERCIAL

## 2024-10-30 VITALS
RESPIRATION RATE: 20 BRPM | HEIGHT: 63 IN | DIASTOLIC BLOOD PRESSURE: 95 MMHG | OXYGEN SATURATION: 99 % | SYSTOLIC BLOOD PRESSURE: 136 MMHG | BODY MASS INDEX: 26.58 KG/M2 | HEART RATE: 84 BPM | TEMPERATURE: 98.2 F | WEIGHT: 150 LBS

## 2024-10-30 DIAGNOSIS — J01.90 ACUTE SINUSITIS, RECURRENCE NOT SPECIFIED, UNSPECIFIED LOCATION: Primary | ICD-10-CM

## 2024-10-30 PROCEDURE — 99203 OFFICE O/P NEW LOW 30 MIN: CPT | Performed by: FAMILY MEDICINE

## 2024-10-30 RX ORDER — AMOXICILLIN 875 MG/1
875 TABLET, FILM COATED ORAL 2 TIMES DAILY
Qty: 14 TABLET | Refills: 0 | Status: SHIPPED | OUTPATIENT
Start: 2024-10-30 | End: 2024-11-06

## 2024-10-30 RX ORDER — AZITHROMYCIN 250 MG/1
TABLET, FILM COATED ORAL
Qty: 6 TABLET | Refills: 0 | Status: SHIPPED | OUTPATIENT
Start: 2024-10-30 | End: 2024-10-30 | Stop reason: ALTCHOICE

## 2024-10-30 ASSESSMENT — PATIENT HEALTH QUESTIONNAIRE - PHQ9
2. FEELING DOWN, DEPRESSED OR HOPELESS: NOT AT ALL
1. LITTLE INTEREST OR PLEASURE IN DOING THINGS: NOT AT ALL
SUM OF ALL RESPONSES TO PHQ9 QUESTIONS 1 AND 2: 0

## 2025-03-23 DIAGNOSIS — I10 BENIGN ESSENTIAL HYPERTENSION: ICD-10-CM

## 2025-04-11 RX ORDER — SPIRONOLACTONE 25 MG/1
25 TABLET ORAL DAILY
Qty: 90 TABLET | Refills: 0 | Status: SHIPPED | OUTPATIENT
Start: 2025-04-11

## 2025-06-02 NOTE — PROGRESS NOTES
REASON FOR VISIT:  Crohn's disease    HPI:  Clarice Handy is a 35 y.o. female who presents for follow-up.  Crohn's gastritis and ileocolitis diagnosed at age 15 in 2005, when she presented with diarrhea, abdominal pain, and dehydration. Upper endoscopy and colonoscopy showed a granulomatous gastritis along with duodenitis and active ileocolonic disease to the mid-transverse colon.      Initially treated with prednisone and azathioprine. Over approximately 4 months prednisone was weaned and she was maintained on azathioprine for approximately 1-1/2 years.      (+) Vitamin D Deficiency     Stopped all meds in 2007 and remained well. She had a followup upper endoscopy and colonoscopy in October 2010, which showed only mild architectural abnormalities in the right colon, mild active chronic ileitis, and mild gastritis.     2015 c/o diffuse joint discomforts. Her bowel symptoms were minor. She had one formed BM daily without any blood or mucus. Budesonide and Pentasa started.      2016 reported improvement with meds and some recurrence of joint pain after tapered off.      2/2022 in clinical remission off medications until Covid infection the prior month with some symptom flare; improved on her own. Fecal Calprotectin 44 ug/g.      5/2023 BM daily; pudding consistency, no blood. C/O joint pain elbows, knees, hands, wrists - notices better if avoids gluten. On Wegovy weight dropped 16 lbs. Iron pills causes nausea. Has had lower back pain that she sometimes gets cortisone shots for. Celiac panel with elevated TTG-IgA.      9/2023 EGD/colonoscopy: gastritis; normal ileum and patch of colitis in cecum and some small pseudopolyps in descending colon.  Path showed duodenum with increased intraepithelial lymphocytes and focal villous blunting.     Last seen 4/2024 and was completely gluten-free for the past year for concern for Celiac disease based on serologies 5/16/23 and findings on EGD 9/2023. Notes improvement in joint  pains, skin findings, abdominal bloating, and overall inflammatory symptoms. Notes 1 semi-formed BM/day without bleeding or abdominal pain.  Patient with significant weight loss on semaglutide with 55 lbs with total 70 lbs. Felt to likely have both Crohn's disease and celiac disease.  Celiac genetic panel checked and permissive for celiac disease.  Also HLA DQ A105 (+).    In follow-up today, patient reports feeling well. She has continued to maintain a gluten free diet and this has really helped her.  Denies any diarrhea, constipation or bloody stool. She currently does not take any medications for Crohn's disease. She does report joint pains when she inadvertently gets exposed to gluten. Denies any recent weight loss but she is on Wegovy that is prescribed from [employer].    Recent 4/2025 outside labs from PCP normal CBC, CMP except very slightly increased homocystein level with normal MMA and B12 295.  She was told to take an over-the-counter vitamin B12 tablet.  She took 1 and had emesis and has not taken any further.    REVIEW OF SYSTEMS  Complete review of systems otherwise negative per complaint    Allergies   Allergen Reactions    Sumatriptan Anaphylaxis    Latex Rash       Past Medical History:   Diagnosis Date    Allergic 2000    Anxiety 2007    Encounter for gynecological examination (general) (routine) with abnormal findings 10/12/2021    Encounter for gynecological examination with abnormal finding    Encounter for gynecological examination (general) (routine) without abnormal findings     Pap smear, as part of routine gynecological examination    Hypertension     Other specified postprocedural states 10/12/2021    History of Papanicolaou smear    Personal history of other diseases of the digestive system 12/31/2013    History of Crohn's disease    Personal history of other diseases of the female genital tract     H/O abnormal Pap smear    Personal history of other infectious and parasitic diseases      History of varicella    Pregnant state, incidental ([facility])     IUP (intrauterine pregnancy), incidental       Past Surgical History:   Procedure Laterality Date    COLPOSCOPY  10/01/2014    Colposcopy    MOUTH SURGERY  10/18/2016    Oral Surgery Tooth Extraction    WISDOM TOOTH EXTRACTION         Current Outpatient Medications   Medication Sig Dispense Refill    ALPRAZolam (Xanax) 0.5 mg tablet Take 1 tablet (0.5 mg) by mouth 3 times a day as needed for anxiety. 20 tablet 0    calcium carbonate (Tums) 200 mg calcium chewable tablet Chew 1 tablet (500 mg) if needed for heartburn. (Patient not taking: Reported on 10/30/2024)      cholecalciferol (Vitamin D-3) 25 MCG (1000 UT) capsule Take 1 capsule (25 mcg) by mouth once daily.      escitalopram (Lexapro) 5 mg tablet Take 1 tablet (5 mg) by mouth once daily. (Patient not taking: Reported on 10/30/2024) 30 tablet 6    spironolactone (Aldactone) 25 mg tablet TAKE 1 TABLET BY MOUTH EVERY DAY 90 tablet 0    Wegovy 2.4 mg/0.75 mL pen injector        No current facility-administered medications for this visit.       PHYSICAL EXAM:  /87   Pulse 90   Temp 36.6 °C (97.8 °F)   Wt 69.7 kg (153 lb 9.6 oz)   SpO2 97% Comment: ra  BMI 27.21 kg/m²   Cardiac: RRR, no murmurs  Pulmonary: clear to auscultation, no wheezing  Abdominal: normal abdominal examination, soft, non-tender, normal bowel sounds  Ambulatory  Skin warm and dry      Lab Results   Component Value Date    WBC 8.7 04/26/2024    HGB 15.4 04/26/2024    HCT 49.8 (H) 04/26/2024    MCV 87 04/26/2024     04/26/2024     Lab Results   Component Value Date    ALT 16 04/26/2024    AST 16 04/26/2024    ALKPHOS 42 04/26/2024    BILITOT 0.6 04/26/2024     ASSESSMENT  #Crohn's disease with gastritis/ileocolitis: Patient is not on medical therapy with reassuring inflammatory markers and endoscopic evaluation 9/2023. She notes intermittent abdominal pain with menstruation with 1 semi-formed BM/day. Denies joint  or skin findings currently - previous noted joint pains and facial purplish rash improved with gluten free diet.    #Celiac disease: Concern based on serologies, duodenal biopsy results, and family history of Celiac disease with confounding Crohn's disease in UGI tract. Patient has been gluten free for the past couple of years with significant improvement in joints, skin, and abdominal bloating. Additionally, follow up celiac labs in 04/2024 showed significant improvement.    Discussed with patient that it is unusual to have both Crohn's disease and celiac disease, but its likely she does truly have both these diseases. Nevertheless, its reasonable for her to pursue a second opinion from a celiac disease specialist and I have suggested she consider a consultation with Dr. Jasson Boss at [facility].    #?vitamn B12?  Is not certain based on lab values if she truly is B12 deficient.  I suggested that she follow-up with her PCP to discuss whether or not parenteral replacement is warranted.    PLAN  - check iron studies, celiac panel, ferritin  - continue current gluten free diet  - follow up in GI clinic in 1 year  -repeat endoscopic evaluation 2028 or sooner if symptoms

## 2025-06-03 ENCOUNTER — OFFICE VISIT (OUTPATIENT)
Dept: GASTROENTEROLOGY | Facility: HOSPITAL | Age: 35
End: 2025-06-03
Payer: COMMERCIAL

## 2025-06-03 VITALS
TEMPERATURE: 97.8 F | BODY MASS INDEX: 27.21 KG/M2 | SYSTOLIC BLOOD PRESSURE: 129 MMHG | HEART RATE: 90 BPM | OXYGEN SATURATION: 97 % | WEIGHT: 153.6 LBS | DIASTOLIC BLOOD PRESSURE: 87 MMHG

## 2025-06-03 DIAGNOSIS — K50.018 CROHN'S DISEASE OF SMALL INTESTINE WITH OTHER COMPLICATION: Primary | ICD-10-CM

## 2025-06-03 DIAGNOSIS — K90.0 CELIAC DISEASE (HHS-HCC): ICD-10-CM

## 2025-06-03 PROCEDURE — 99214 OFFICE O/P EST MOD 30 MIN: CPT | Performed by: INTERNAL MEDICINE

## 2025-06-03 PROCEDURE — 3074F SYST BP LT 130 MM HG: CPT | Performed by: INTERNAL MEDICINE

## 2025-06-03 PROCEDURE — 3079F DIAST BP 80-89 MM HG: CPT | Performed by: INTERNAL MEDICINE

## 2025-06-03 PROCEDURE — 99212 OFFICE O/P EST SF 10 MIN: CPT | Performed by: INTERNAL MEDICINE

## 2025-06-03 PROCEDURE — 1036F TOBACCO NON-USER: CPT | Performed by: INTERNAL MEDICINE

## 2025-06-03 ASSESSMENT — ENCOUNTER SYMPTOMS
OCCASIONAL FEELINGS OF UNSTEADINESS: 0
LOSS OF SENSATION IN FEET: 0
DEPRESSION: 0

## 2025-06-03 ASSESSMENT — PAIN SCALES - GENERAL: PAINLEVEL_OUTOF10: 0-NO PAIN

## 2025-06-03 NOTE — PATIENT INSTRUCTIONS
Thank you for coming in for follow-up today.  It is great that you continue to feel well on the gluten-free diet without any signs or symptoms of active celiac disease or Crohn's disease.  As we reviewed today:    PLAN  1) check labs  2) continue current gluten free diet  3) as long as you feel well, follow up in GI clinic in 1 year  4) repeat endoscopic evaluation 2028 or sooner if you again become symptomatic  5) given the rarity of having both Crohn's disease and celiac disease, you may want to consider consultation with Dr. Jasson Jimenez, an internationally known Celiac disease expert,  at the [facility]

## 2025-06-30 DIAGNOSIS — K50.018 CROHN'S DISEASE OF SMALL INTESTINE WITH OTHER COMPLICATION: Primary | ICD-10-CM

## 2025-07-01 RX ORDER — ONDANSETRON 4 MG/1
4 TABLET, FILM COATED ORAL 3 TIMES DAILY
Qty: 20 TABLET | Refills: 0 | Status: SHIPPED | OUTPATIENT
Start: 2025-07-01 | End: 2025-07-11
